# Patient Record
Sex: FEMALE | Race: OTHER | NOT HISPANIC OR LATINO | URBAN - METROPOLITAN AREA
[De-identification: names, ages, dates, MRNs, and addresses within clinical notes are randomized per-mention and may not be internally consistent; named-entity substitution may affect disease eponyms.]

---

## 2018-12-08 ENCOUNTER — INPATIENT (INPATIENT)
Facility: HOSPITAL | Age: 83
LOS: 1 days | Discharge: HOME | DRG: 312 | End: 2018-12-10
Attending: INTERNAL MEDICINE | Admitting: INTERNAL MEDICINE
Payer: MEDICARE

## 2018-12-08 VITALS
TEMPERATURE: 97 F | OXYGEN SATURATION: 100 % | HEART RATE: 65 BPM | SYSTOLIC BLOOD PRESSURE: 108 MMHG | DIASTOLIC BLOOD PRESSURE: 71 MMHG | RESPIRATION RATE: 17 BRPM

## 2018-12-08 DIAGNOSIS — R55 SYNCOPE AND COLLAPSE: ICD-10-CM

## 2018-12-08 DIAGNOSIS — D64.9 ANEMIA, UNSPECIFIED: ICD-10-CM

## 2018-12-08 DIAGNOSIS — Z29.9 ENCOUNTER FOR PROPHYLACTIC MEASURES, UNSPECIFIED: ICD-10-CM

## 2018-12-08 DIAGNOSIS — I10 ESSENTIAL (PRIMARY) HYPERTENSION: ICD-10-CM

## 2018-12-08 LAB
ALBUMIN SERPL ELPH-MCNC: 4.3 G/DL — SIGNIFICANT CHANGE UP (ref 3.3–5)
ALP SERPL-CCNC: 52 U/L — SIGNIFICANT CHANGE UP (ref 40–120)
ALT FLD-CCNC: 12 U/L — SIGNIFICANT CHANGE UP (ref 10–45)
ANION GAP SERPL CALC-SCNC: 15 MMOL/L — SIGNIFICANT CHANGE UP (ref 5–17)
AST SERPL-CCNC: 16 U/L — SIGNIFICANT CHANGE UP (ref 10–40)
BASOPHILS NFR BLD AUTO: 0.6 % — SIGNIFICANT CHANGE UP (ref 0–2)
BILIRUB SERPL-MCNC: <0.2 MG/DL — SIGNIFICANT CHANGE UP (ref 0.2–1.2)
BUN SERPL-MCNC: 17 MG/DL — SIGNIFICANT CHANGE UP (ref 7–23)
CALCIUM SERPL-MCNC: 9.4 MG/DL — SIGNIFICANT CHANGE UP (ref 8.4–10.5)
CHLORIDE SERPL-SCNC: 100 MMOL/L — SIGNIFICANT CHANGE UP (ref 96–108)
CO2 SERPL-SCNC: 24 MMOL/L — SIGNIFICANT CHANGE UP (ref 22–31)
CREAT SERPL-MCNC: 0.87 MG/DL — SIGNIFICANT CHANGE UP (ref 0.5–1.3)
EOSINOPHIL NFR BLD AUTO: 1.2 % — SIGNIFICANT CHANGE UP (ref 0–6)
GLUCOSE SERPL-MCNC: 123 MG/DL — HIGH (ref 70–99)
HCT VFR BLD CALC: 33 % — LOW (ref 34.5–45)
HGB BLD-MCNC: 10.3 G/DL — LOW (ref 11.5–15.5)
LYMPHOCYTES # BLD AUTO: 20.8 % — SIGNIFICANT CHANGE UP (ref 13–44)
MCHC RBC-ENTMCNC: 24.9 PG — LOW (ref 27–34)
MCHC RBC-ENTMCNC: 31.2 G/DL — LOW (ref 32–36)
MCV RBC AUTO: 79.9 FL — LOW (ref 80–100)
MONOCYTES NFR BLD AUTO: 8.9 % — SIGNIFICANT CHANGE UP (ref 2–14)
NEUTROPHILS NFR BLD AUTO: 68.5 % — SIGNIFICANT CHANGE UP (ref 43–77)
NT-PROBNP SERPL-SCNC: 87 PG/ML — SIGNIFICANT CHANGE UP (ref 0–300)
PLATELET # BLD AUTO: 386 K/UL — SIGNIFICANT CHANGE UP (ref 150–400)
POTASSIUM SERPL-MCNC: 3.8 MMOL/L — SIGNIFICANT CHANGE UP (ref 3.5–5.3)
POTASSIUM SERPL-SCNC: 3.8 MMOL/L — SIGNIFICANT CHANGE UP (ref 3.5–5.3)
PROT SERPL-MCNC: 7 G/DL — SIGNIFICANT CHANGE UP (ref 6–8.3)
RBC # BLD: 4.13 M/UL — SIGNIFICANT CHANGE UP (ref 3.8–5.2)
RBC # FLD: 15 % — SIGNIFICANT CHANGE UP (ref 10.3–16.9)
SODIUM SERPL-SCNC: 139 MMOL/L — SIGNIFICANT CHANGE UP (ref 135–145)
TROPONIN T SERPL-MCNC: <0.01 NG/ML — SIGNIFICANT CHANGE UP (ref 0–0.01)
WBC # BLD: 8.2 K/UL — SIGNIFICANT CHANGE UP (ref 3.8–10.5)
WBC # FLD AUTO: 8.2 K/UL — SIGNIFICANT CHANGE UP (ref 3.8–10.5)

## 2018-12-08 PROCEDURE — 93010 ELECTROCARDIOGRAM REPORT: CPT

## 2018-12-08 PROCEDURE — 71045 X-RAY EXAM CHEST 1 VIEW: CPT | Mod: 26

## 2018-12-08 PROCEDURE — 99285 EMERGENCY DEPT VISIT HI MDM: CPT | Mod: 25

## 2018-12-08 PROCEDURE — 99223 1ST HOSP IP/OBS HIGH 75: CPT | Mod: AI,GC

## 2018-12-08 RX ORDER — AMLODIPINE BESYLATE 2.5 MG/1
5 TABLET ORAL DAILY
Qty: 0 | Refills: 0 | Status: DISCONTINUED | OUTPATIENT
Start: 2018-12-09 | End: 2018-12-10

## 2018-12-08 RX ORDER — HEPARIN SODIUM 5000 [USP'U]/ML
5000 INJECTION INTRAVENOUS; SUBCUTANEOUS EVERY 8 HOURS
Qty: 0 | Refills: 0 | Status: DISCONTINUED | OUTPATIENT
Start: 2018-12-08 | End: 2018-12-10

## 2018-12-08 RX ORDER — ASPIRIN/CALCIUM CARB/MAGNESIUM 324 MG
81 TABLET ORAL DAILY
Qty: 0 | Refills: 0 | Status: DISCONTINUED | OUTPATIENT
Start: 2018-12-08 | End: 2018-12-10

## 2018-12-08 NOTE — H&P ADULT - PROBLEM SELECTOR PLAN 3
History of HTN. Takes History of HTN. Takes amlodipine 5 or 10mg PO qd? Also takes indapamide. unclear of dose  -obtain collateral from home pharmacy in AM ([facility] [phone])  -start amlodipine 5mg PO qd  -monitor BP

## 2018-12-08 NOTE — H&P ADULT - NSHPPHYSICALEXAM_GEN_ALL_CORE
PHYSICAL EXAM:  GENERAL: NAD, well-developed, resting comfortably in bed.   HEENT: EOMI, PERRLA, conjunctiva and sclera clear, MMM  NECK: Supple, No JVD  CHEST/LUNG: Clear to auscultation bilaterally; No wheezes/rales/rhonci.   HEART: Regular rate and rhythm; No murmurs, rubs, or gallops  ABDOMEN: Soft, Nontender, Nondistended; Bowel sounds present  EXTREMITIES:, No clubbing, cyanosis, or edema  NEUROLOGY: non-focal, AAOx3  SKIN: No rashes or lesions

## 2018-12-08 NOTE — ED ADULT NURSE NOTE - OBJECTIVE STATEMENT
Patient present to ED with complains of syncope episode today after dinner, as per family member, patient did not hit head, was caught going down slowly, denies chest pain, nausea, vomiting, fever, chills, and abdomen pain.

## 2018-12-08 NOTE — ED ADULT TRIAGE NOTE - CHIEF COMPLAINT QUOTE
biba for syncopal episode.  Patient walking home and passed out for about 15 secs.  Fs 154.  Denies chest pain, dizziness, pain.  Speaking clearly and coherently in full sentences

## 2018-12-08 NOTE — ED PROVIDER NOTE - PHYSICAL EXAMINATION
CONSTITUTIONAL: WD,WN. NAD.    SKIN: Normal color and turgor. No rash.    HEAD: NC/AT.  EYES: Conjunctiva clear. EOMI. PERRL.    ENT: Airway patent, OP without erythema, tonsillar swelling or exudate; uvula midline without swelling. Nasal mucosa clear, no rhinorrhea.   RESPIRATORY:  Breathing non-labored. No retractions or accessory muscle use.  Lungs bilateral crackles at bases.  CARDIOVASCULAR:  RRR, S1S2. No M/R/G.      GI:  Abdomen soft, nontender.    MSK: Neck supple with painless ROM.  No lower extremity edema or calf tenderness.  No joint swelling or ROM limitation.  NEURO: Alert and oriented; CN II-XII grossly intact. Speech clear. 5/5 strength in all extremities.  Normal balance and gait.

## 2018-12-08 NOTE — ED ADULT NURSE NOTE - NSIMPLEMENTINTERV_GEN_ALL_ED
Implemented All Fall Risk Interventions:  [place] to call system. Call bell, personal items and telephone within reach. Instruct patient to call for assistance. Room bathroom lighting operational. Non-slip footwear when patient is off stretcher. Physically safe environment: no spills, clutter or unnecessary equipment. Stretcher in lowest position, wheels locked, appropriate side rails in place. Provide visual cue, wrist band, yellow gown, etc. Monitor gait and stability. Monitor for mental status changes and reorient to person, place, and time. Review medications for side effects contributing to fall risk. Reinforce activity limits and safety measures with patient and family.

## 2018-12-08 NOTE — H&P ADULT - ATTENDING COMMENTS
Assessment: Patient personally seen and examined myself during rounds with the Physician Assistant/House Staff/Nurse Practitioner   ON DATE 12/8/18  Physician Assistant/House Staff/Nurse Practitioner note read, including vitals, physical findings, laboratory data, and radiological reports.   Revisions included below.   Direct personal management at bed side and extensive interpretation of the data.    Plan was outlined and discussed in details with the Physician Assistant/House Staff/Nurse Practitioner.    Decision making of high complexity   Risk high of complications, morbidity, and/or mortality  Assessment and Action taken for acute disease activity to reflect the level of care provided:  -Hemodynamic evaluation and support  -ACS assessment and treatment as applicable  -Heart failure assessment and treatment as applicable  -Cardiac Telemetry reviewed  -Medication reconciliation  -Review laboratory data  -EKG reviewed - no stemi  -Echo ordered  -Interdisciplinary discussion with IC / EP / HF / CTS teams as needed  TIME SPENT in evaluation and management, reassessments, review and interpretation of labs and x-rays, and hemodynamic management, formulating a plan and coordinating care: ___70____ MIN.  Time does not include procedural time.    Faustino Cross MD  Cardiology    Mobile: [phone]  Office: [phone]  [address] [place], [place] [address]

## 2018-12-08 NOTE — H&P ADULT - NSHPLABSRESULTS_GEN_ALL_CORE
LABS:                         10.3   8.2   )-----------( 386      ( 08 Dec 2018 20:22 )             33.0     12-08    139  |  100  |  17  ----------------------------<  123<H>  3.8   |  24  |  0.87    Ca    9.4      08 Dec 2018 20:22    TPro  7.0  /  Alb  4.3  /  TBili  <0.2  /  DBili  x   /  AST  16  /  ALT  12  /  AlkPhos  52  12-08        CARDIAC MARKERS ( 08 Dec 2018 20:22 )  x     / <0.01 ng/mL / x     / x     / x          Serum Pro-Brain Natriuretic Peptide: 87 pg/mL (12-08 @ 20:22)        RADIOLOGY, EKG & ADDITIONAL TESTS: Reviewed.

## 2018-12-08 NOTE — ED PROVIDER NOTE - NS ED ROS FT
CONSTITUTIONAL: No fever, chills, or weakness  NEURO: No headache, no dizziness; No focal weakness/tingling/numbness  EYES: No visual changes  ENT: No rhinorrhea or sore throat  PULM: Recent cough/URI.  No dyspnea  CV: No chest pain or palpitations; no leg swelling; no orthopnea or CRUZ.  GI: No abdominal pain, vomiting, or diarrhea  : No dysuria, hematuria, frequency  MSK: No neck pain or back pain, no joint pain  SKIN: no rash or unusual bruising

## 2018-12-08 NOTE — ED PROVIDER NOTE - MEDICAL DECISION MAKING DETAILS
86 yo fem s/p syncopal episode with prodrome.  EKG no ischemic changes. Check labs, keep on monitor, CXR, admit for cardiac monitoring.

## 2018-12-08 NOTE — H&P ADULT - HISTORY OF PRESENT ILLNESS
ED Course: In the ED vitals were T 97.3, HR 65, //60, RR 17, O2 sat 98 RA. Labs significant for Hb 10.3, HCT 33, trop <0.01, pro BNP 87. In ED patient did not receive any medications or fluids. 87F w PMH of HTN presents after syncopal episode this evening. Patient states that she went to dinner tonight and as she stood up to leave the dinner she felt weak and shaky. She said she couldn't walk with out holding on to son's arm for support. She did not have any vision changes, diaphoresis, or palpitations. She walked a block and then began to slump down and "passed out."  Pt has no memory of the syncopal episode.  Per family she had LOC of 15-30 seconds. She did not hit her head. She did not bite her tongue or have urinary incontinence. There was no trauma. Her family called 911 and she was brought to [facility] ED. Pt states that after she regained consciousness she was immediately alert and oriented. She did not have any postictal symptoms. Pt states that she has felt shaky on her feet for the past few days. She has also felt dehydrated and has been eating and drinking less. She had a slight cold 1 week ago but did not have a fever. She has a non productive cough. On ROS pt denies fever/chills, CP, SOB, palpitations, abdominal pain, n/v, d/c, dysuria.    ED Course: In the ED vitals were T 97.3, HR 65, //60, RR 17, O2 sat 98 RA. Labs significant for Hb 10.3, HCT 33, trop <0.01, pro BNP 87. EKG showed NSRR, 1st deg block, , PACs, no ischemic changes In ED patient did not receive any medications or fluids. 87F w PMH of HTN presents after syncopal episode this evening. Patient states that she went to dinner tonight and as she stood up to leave the dinner she felt weak and shaky. She said she couldn't walk without holding on to son's arm for support. She did not have any vision changes, diaphoresis, or palpitations. She walked a block and then began to slump down and "passed out."  Pt has no memory of the syncopal episode.  Per family she had LOC of 15-30 seconds. She did not hit her head. She did not bite her tongue or have urinary incontinence. There was no trauma. Her family called 911 and she was brought to [facility] ED. Pt states that after she regained consciousness she was immediately alert and oriented. She did not have any postictal symptoms. Pt has never had a syncopal episode before. Pt states that she has felt shaky on her feet for the past few days. She has also felt dehydrated and has been eating and drinking less. She had a slight cold 1 week ago with a non productive cough, but did not have a fever or SOB. On ROS pt denies fever/chills, CP, SOB, palpitations, abdominal pain, n/v, d/c, dysuria.    ED Course: In the ED vitals were T 97.3, HR 65, //60, RR 17, O2 sat 98 RA. Labs significant for Hb 10.3, HCT 33, trop <0.01, pro BNP 87. EKG showed NSRR, 1st deg block, , PACs, no ischemic changes In ED patient did not receive any medications or fluids.

## 2018-12-08 NOTE — H&P ADULT - ASSESSMENT
87F w [facility] of 87F w PMH of HTN presents after syncopal episode tonight with +LOC. Admitted to [facility] for telemetry and syncope workup.

## 2018-12-08 NOTE — PATIENT PROFILE ADULT - NSPROGENPREVTRANSF_GEN_A_NUR
I have personally evaluated and examined the patient. The Attending was available to me as a supervising provider if needed. no

## 2018-12-08 NOTE — ED PROVIDER NOTE - OBJECTIVE STATEMENT
86 yo fem with Hx of HTN BIBA after syncopal episode.  Had dinner with family at restaurant; suddenly weak when she tried to stand up to leave.  She had eaten dinner and had only a small sip of wine.  Walking with family holding onto son's arm she got weaker and had to stop after 1 block, then began to slump down.  She was lowered to sidewalk by her daughter-in-law while son called 911.  LOC of apx 15-30 seconds.  No trauma.  Awake and alert now without complaints.  There were no palpitations, CP, SOB.  No hx of fainting in the past.  Recent mild URI with cough in past week, no sputum or SOB.  Not bedbound or immobilized.  No diarrhea.      Cardiologist ([facility]) Dr Xavier [phone]  PMHx HTN  PSHx thyroid surgery  Meds indapamide (10 mg?) amlodipine 10 mg, aspirin 81 mg  NKA  Social Hx nonsmoker  Family Hx: father  at young age of cerebral hemorrhage, mother  of "old age", brother  of mesothelioma

## 2018-12-08 NOTE — ED PROVIDER NOTE - ATTENDING CONTRIBUTION TO CARE
87 yom pw syncope, states was at dinner, felt weak upon standing up.  no cp, no sob, no other complaints.  no abd pain.     agree w/ PA, nontoxic appearing, no hypoxia, no tachypnea, will check serial enzyme, possibly need admission for medical optimization, reassess

## 2018-12-09 LAB
ANION GAP SERPL CALC-SCNC: 10 MMOL/L — SIGNIFICANT CHANGE UP (ref 5–17)
APTT BLD: 28 SEC — SIGNIFICANT CHANGE UP (ref 27.5–36.3)
BUN SERPL-MCNC: 17 MG/DL — SIGNIFICANT CHANGE UP (ref 7–23)
CALCIUM SERPL-MCNC: 8.8 MG/DL — SIGNIFICANT CHANGE UP (ref 8.4–10.5)
CHLORIDE SERPL-SCNC: 104 MMOL/L — SIGNIFICANT CHANGE UP (ref 96–108)
CO2 SERPL-SCNC: 26 MMOL/L — SIGNIFICANT CHANGE UP (ref 22–31)
CREAT SERPL-MCNC: 0.82 MG/DL — SIGNIFICANT CHANGE UP (ref 0.5–1.3)
FERRITIN SERPL-MCNC: 13 NG/ML — LOW (ref 15–150)
GLUCOSE SERPL-MCNC: 98 MG/DL — SIGNIFICANT CHANGE UP (ref 70–99)
HCT VFR BLD CALC: 31.9 % — LOW (ref 34.5–45)
HGB BLD-MCNC: 10 G/DL — LOW (ref 11.5–15.5)
INR BLD: 0.97 — SIGNIFICANT CHANGE UP (ref 0.88–1.16)
IRON SATN MFR SERPL: 21 UG/DL — LOW (ref 30–160)
IRON SATN MFR SERPL: 6 % — LOW (ref 14–50)
MAGNESIUM SERPL-MCNC: 2.1 MG/DL — SIGNIFICANT CHANGE UP (ref 1.6–2.6)
MCHC RBC-ENTMCNC: 25.1 PG — LOW (ref 27–34)
MCHC RBC-ENTMCNC: 31.3 G/DL — LOW (ref 32–36)
MCV RBC AUTO: 79.9 FL — LOW (ref 80–100)
PHOSPHATE SERPL-MCNC: 3.5 MG/DL — SIGNIFICANT CHANGE UP (ref 2.5–4.5)
PLATELET # BLD AUTO: 383 K/UL — SIGNIFICANT CHANGE UP (ref 150–400)
POTASSIUM SERPL-MCNC: 3.9 MMOL/L — SIGNIFICANT CHANGE UP (ref 3.5–5.3)
POTASSIUM SERPL-SCNC: 3.9 MMOL/L — SIGNIFICANT CHANGE UP (ref 3.5–5.3)
PROTHROM AB SERPL-ACNC: 11 SEC — SIGNIFICANT CHANGE UP (ref 10–12.9)
RAPID RVP RESULT: SIGNIFICANT CHANGE UP
RBC # BLD: 3.99 M/UL — SIGNIFICANT CHANGE UP (ref 3.8–5.2)
RBC # FLD: 15.2 % — SIGNIFICANT CHANGE UP (ref 10.3–16.9)
SODIUM SERPL-SCNC: 140 MMOL/L — SIGNIFICANT CHANGE UP (ref 135–145)
TIBC SERPL-MCNC: 353 UG/DL — SIGNIFICANT CHANGE UP (ref 220–430)
UIBC SERPL-MCNC: 332 UG/DL — SIGNIFICANT CHANGE UP (ref 110–370)
WBC # BLD: 6.4 K/UL — SIGNIFICANT CHANGE UP (ref 3.8–10.5)
WBC # FLD AUTO: 6.4 K/UL — SIGNIFICANT CHANGE UP (ref 3.8–10.5)

## 2018-12-09 PROCEDURE — 99232 SBSQ HOSP IP/OBS MODERATE 35: CPT

## 2018-12-09 RX ORDER — FERROUS SULFATE 325(65) MG
325 TABLET ORAL THREE TIMES A DAY
Qty: 0 | Refills: 0 | Status: DISCONTINUED | OUTPATIENT
Start: 2018-12-09 | End: 2018-12-10

## 2018-12-09 RX ORDER — INDAPAMIDE 1.25 MG
10 TABLET ORAL
Qty: 0 | Refills: 0 | COMMUNITY

## 2018-12-09 RX ORDER — ASPIRIN/CALCIUM CARB/MAGNESIUM 324 MG
1 TABLET ORAL
Qty: 0 | Refills: 0 | COMMUNITY

## 2018-12-09 RX ORDER — AMLODIPINE BESYLATE 2.5 MG/1
1 TABLET ORAL
Qty: 0 | Refills: 0 | COMMUNITY

## 2018-12-09 RX ADMIN — Medication 325 MILLIGRAM(S): at 14:53

## 2018-12-09 RX ADMIN — AMLODIPINE BESYLATE 5 MILLIGRAM(S): 2.5 TABLET ORAL at 08:53

## 2018-12-09 RX ADMIN — HEPARIN SODIUM 5000 UNIT(S): 5000 INJECTION INTRAVENOUS; SUBCUTANEOUS at 14:53

## 2018-12-09 RX ADMIN — HEPARIN SODIUM 5000 UNIT(S): 5000 INJECTION INTRAVENOUS; SUBCUTANEOUS at 06:51

## 2018-12-09 RX ADMIN — HEPARIN SODIUM 5000 UNIT(S): 5000 INJECTION INTRAVENOUS; SUBCUTANEOUS at 21:37

## 2018-12-09 RX ADMIN — Medication 81 MILLIGRAM(S): at 12:09

## 2018-12-09 RX ADMIN — Medication 325 MILLIGRAM(S): at 21:37

## 2018-12-09 NOTE — PROGRESS NOTE ADULT - PROBLEM SELECTOR PLAN 4
DVT ppx: Hep sc  F: No IVF  E: K>4, Mg>2, replete PRN  N: DASH diet- kosher   PT recommends home with outpatient PT

## 2018-12-09 NOTE — PHYSICAL THERAPY INITIAL EVALUATION ADULT - MODALITIES TREATMENT COMMENTS
Home exercise program provided: including heel raises, hip ABD, mini squats & hamstring curls, patient performed return demo of each

## 2018-12-09 NOTE — PHYSICAL THERAPY INITIAL EVALUATION ADULT - GENERAL OBSERVATIONS, REHAB EVAL
Patient received semi fowlers (+) EKG (+) heplock no apparent distress, demonstrating all mobility skills with supervision, patient is cleared for discharge home with follow up with outpatient PT for strength and balance training.

## 2018-12-09 NOTE — PROGRESS NOTE ADULT - PROBLEM SELECTOR PLAN 2
Presenting with Hb of 10.3. MCV 79.9. Could be 2/2 iron deficiency anemia. May be chronic; as per patient is chronic and has been ongoing; colonoscopy done within 10 years with no polyps found per patient; iron studies suggestive of deficiency   - start fe sulfate TID   -obtain collateral about baseline Hb   -maintain active type and screen, transfuse if Hb<7

## 2018-12-09 NOTE — PROGRESS NOTE ADULT - ASSESSMENT
87F w PMH of HTN presents after syncopal episode tonight with +LOC. Admitted to [facility] for telemetry and syncope workup.

## 2018-12-09 NOTE — PROGRESS NOTE ADULT - SUBJECTIVE AND OBJECTIVE BOX
INTERVAL HPI/OVERNIGHT EVENTS:    VITAL SIGNS:  T(F): 98.5 (12-09-18 @ 08:17)  HR: 70 (12-09-18 @ 08:45)  BP: 126/68 (12-09-18 @ 08:45)  RR: 16 (12-09-18 @ 08:45)  SpO2: 98% (12-09-18 @ 08:45)  Wt(kg): --    PHYSICAL EXAM:    Constitutional:  Eyes:  ENMT:  Neck:  Respiratory:  Cardiovascular:  Gastrointestinal:  Extremities:  Vascular:  Neurological:  Musculoskeletal:    MEDICATIONS  (STANDING):  amLODIPine   Tablet 5 milliGRAM(s) Oral daily  aspirin  chewable 81 milliGRAM(s) Oral daily  heparin  Injectable 5000 Unit(s) SubCutaneous every 8 hours    MEDICATIONS  (PRN):      Allergies    No Known Allergies    Intolerances        LABS:                      10.0   6.4   )-----------( 383      ( 09 Dec 2018 06:18 )             31.9     12-09    140  |  104  |  17  ----------------------------<  98  3.9   |  26  |  0.82    Ca    8.8      09 Dec 2018 06:18  Phos  3.5     12-09  Mg     2.1     12-09    TPro  7.0  /  Alb  4.3  /  TBili  <0.2  /  DBili  x   /  AST  16  /  ALT  12  /  AlkPhos  52  12-08    PT/INR - ( 09 Dec 2018 06:18 )   PT: 11.0 sec;   INR: 0.97          PTT - ( 09 Dec 2018 06:18 )  PTT:28.0 sec      RADIOLOGY & ADDITIONAL TESTS:  12/8 CXR is clear with no obvious abnormality INTERVAL HPI/OVERNIGHT EVENTS:    Patient seen and examined at bedside. reports no acute issues. Denies any dizziness, headache, CP, SOB, cough abdominal pain, N/V/D or urinary habits changes,   .  VITAL SIGNS:  T(C): 36.9 (12-09-18 @ 08:17), Max: 37.1 (12-08-18 @ 22:25)  T(F): 98.5 (12-09-18 @ 08:17), Max: 98.8 (12-08-18 @ 22:25)  HR: 70 (12-09-18 @ 08:45) (65 - 92)  BP: 126/68 (12-09-18 @ 08:45) (108/71 - 151/78)  BP(mean): 88 (12-09-18 @ 08:45) (84 - 114)  RR: 16 (12-09-18 @ 08:45) (16 - 17)  SpO2: 98% (12-09-18 @ 08:45) (94% - 100%)  Wt(kg): --    PHYSICAL EXAM:    Constitutional: WDWN resting comfortably in bed; NAD, elderly; wears glasses   Head: NC/AT  Eyes: PERRL, EOMI, clear conjunctiva  ENT: no nasal discharge; uvula midline, no oropharyngeal erythema or exudates; MMM  Neck: supple; no JVD  Respiratory: CTA B/L; no W/R/R, no retractions  Cardiac: +S1/S2; RRR; no M/R/G;   Gastrointestinal: soft, NT/ND; no rebound or guarding; +BS  Genitourinary: normal external genitalia  Extremities: WWP, no clubbing or cyanosis; no peripheral edema  Musculoskeletal: NROM x4;  Vascular: 2+ radial and DP pulses B/L  Dermatologic: skin warm, dry and intact; no rashes, wounds, or scars  Neurologic: AAOx3; CNII-XII intact; no focal deficits  Psychiatric: affect and characteristics of appearance, verbalizations, behaviors are appropriate  MEDICATIONS  (STANDING):  amLODIPine   Tablet 5 milliGRAM(s) Oral daily  aspirin  chewable 81 milliGRAM(s) Oral daily  heparin  Injectable 5000 Unit(s) SubCutaneous every 8 hours    MEDICATIONS  (PRN):      Allergies    No Known Allergies    Intolerances        LABS:                      10.0   6.4   )-----------( 383      ( 09 Dec 2018 06:18 )             31.9     12-09    140  |  104  |  17  ----------------------------<  98  3.9   |  26  |  0.82    Ca    8.8      09 Dec 2018 06:18  Phos  3.5     12-09  Mg     2.1     12-09    TPro  7.0  /  Alb  4.3  /  TBili  <0.2  /  DBili  x   /  AST  16  /  ALT  12  /  AlkPhos  52  12-08    PT/INR - ( 09 Dec 2018 06:18 )   PT: 11.0 sec;   INR: 0.97          PTT - ( 09 Dec 2018 06:18 )  PTT:28.0 sec      RADIOLOGY & ADDITIONAL TESTS:  12/8 CXR is clear with no obvious abnormality INTERVAL HPI/OVERNIGHT EVENTS:    Patient seen and examined at bedside. reports no acute issues. Denies any dizziness, headache, CP, SOB, cough abdominal pain, N/V/D or urinary habits changes,   .  VITAL SIGNS:  T(C): 36.9 (12-09-18 @ 08:17), Max: 37.1 (12-08-18 @ 22:25)  T(F): 98.5 (12-09-18 @ 08:17), Max: 98.8 (12-08-18 @ 22:25)  HR: 70 (12-09-18 @ 08:45) (65 - 92)  BP: 126/68 (12-09-18 @ 08:45) (108/71 - 151/78)  BP(mean): 88 (12-09-18 @ 08:45) (84 - 114)  RR: 16 (12-09-18 @ 08:45) (16 - 17)  SpO2: 98% (12-09-18 @ 08:45) (94% - 100%)  Wt(kg): --    PHYSICAL EXAM:    Constitutional: WDWN resting comfortably in bed; NAD, elderly; wears glasses   Head: NC/AT  Eyes: PERRL, EOMI, clear conjunctiva  ENT: no nasal discharge; uvula midline, no oropharyngeal erythema or exudates; MMM  Neck: supple; no JVD  Respiratory: CTA B/L; no W/R/R, no retractions  Cardiac: +S1/S2; 2/6 systolic murmur appreciated at the RSB  no M/R/G;   Gastrointestinal: soft, NT/ND; no rebound or guarding; +BS  Genitourinary: normal external genitalia  Extremities: WWP, no clubbing or cyanosis; no peripheral edema  Musculoskeletal: NROM x4;  Vascular: 2+ radial and DP pulses B/L  Dermatologic: skin warm, dry and intact; no rashes, wounds, or scars  Neurologic: AAOx3; CNII-XII intact; no focal deficits  Psychiatric: affect and characteristics of appearance, verbalizations, behaviors are appropriate  MEDICATIONS  (STANDING):  amLODIPine   Tablet 5 milliGRAM(s) Oral daily  aspirin  chewable 81 milliGRAM(s) Oral daily  heparin  Injectable 5000 Unit(s) SubCutaneous every 8 hours    MEDICATIONS  (PRN):      Allergies    No Known Allergies    Intolerances        LABS:                      10.0   6.4   )-----------( 383      ( 09 Dec 2018 06:18 )             31.9     12-09    140  |  104  |  17  ----------------------------<  98  3.9   |  26  |  0.82    Ca    8.8      09 Dec 2018 06:18  Phos  3.5     12-09  Mg     2.1     12-09    TPro  7.0  /  Alb  4.3  /  TBili  <0.2  /  DBili  x   /  AST  16  /  ALT  12  /  AlkPhos  52  12-08    PT/INR - ( 09 Dec 2018 06:18 )   PT: 11.0 sec;   INR: 0.97          PTT - ( 09 Dec 2018 06:18 )  PTT:28.0 sec      RADIOLOGY & ADDITIONAL TESTS:  12/8 CXR is clear with no obvious abnormality

## 2018-12-09 NOTE — PROGRESS NOTE ADULT - PROBLEM SELECTOR PLAN 1
Syncopal episode yesterday . LOC of 15-30 seconds Denied palpitations, CP, SOB. No hx of fainting. ED EKG showed NSRR, 1st deg block, , PACs, no ischemic changes. Trop <0.01. Likely 2/2 vasovagal syncope as pt had prodromal symptoms of weakness when went from sitting to standing. Unlikely to be ischemic etiology however patient with slight systolic murmur appreciated at the RSB; would want to r/o severe aortic stenosis; EKG unremarkable, but continue with telemetry for now.  -admit to [facility]  -AM EKG  -f/u echo   -obtain collateral from pt's cardiologist Dr. Xavier [phone]

## 2018-12-09 NOTE — PHYSICAL THERAPY INITIAL EVALUATION ADULT - ADDITIONAL COMMENTS
patient attends a group at the [address] [place] [address], states she does not use her cane but has one at home, no other DME, independent PTA, performs standing balance exercises, no other formal therex routine.  assists with cleaning chores 1x/week, 1 reported fall while exiting a cab 4 months ago where she tripped on her cane.

## 2018-12-09 NOTE — PHYSICAL THERAPY INITIAL EVALUATION ADULT - PLANNED THERAPY INTERVENTIONS, PT EVAL
transfer training/strengthening/gait training/stair neg assessment/postural re-education/balance training/bed mobility training

## 2018-12-09 NOTE — PROGRESS NOTE ADULT - PROBLEM SELECTOR PLAN 3
History of HTN. Takes amlodipine 5 or 10mg PO qd? Also takes indapamide. unclear of dose  -obtain collateral from home pharmacy in AM ([facility] [phone])  -c/w  amlodipine 5mg PO qd  -monitor BP

## 2018-12-09 NOTE — PHYSICAL THERAPY INITIAL EVALUATION ADULT - PERTINENT HX OF CURRENT PROBLEM, REHAB EVAL
88 yo female presents following syncopal episode with 15-20 second LOC, no head trauma, cleared to participate with PT by JOSE JUAN Camarillo.

## 2018-12-10 VITALS — TEMPERATURE: 99 F

## 2018-12-10 LAB
ANION GAP SERPL CALC-SCNC: 7 MMOL/L — SIGNIFICANT CHANGE UP (ref 5–17)
BUN SERPL-MCNC: 11 MG/DL — SIGNIFICANT CHANGE UP (ref 7–23)
CALCIUM SERPL-MCNC: 9.3 MG/DL — SIGNIFICANT CHANGE UP (ref 8.4–10.5)
CHLORIDE SERPL-SCNC: 105 MMOL/L — SIGNIFICANT CHANGE UP (ref 96–108)
CO2 SERPL-SCNC: 28 MMOL/L — SIGNIFICANT CHANGE UP (ref 22–31)
CREAT SERPL-MCNC: 0.78 MG/DL — SIGNIFICANT CHANGE UP (ref 0.5–1.3)
GLUCOSE SERPL-MCNC: 97 MG/DL — SIGNIFICANT CHANGE UP (ref 70–99)
HCT VFR BLD CALC: 33.8 % — LOW (ref 34.5–45)
HGB BLD-MCNC: 10.5 G/DL — LOW (ref 11.5–15.5)
MAGNESIUM SERPL-MCNC: 2.2 MG/DL — SIGNIFICANT CHANGE UP (ref 1.6–2.6)
MCHC RBC-ENTMCNC: 24.8 PG — LOW (ref 27–34)
MCHC RBC-ENTMCNC: 31.1 G/DL — LOW (ref 32–36)
MCV RBC AUTO: 79.7 FL — LOW (ref 80–100)
PHOSPHATE SERPL-MCNC: 3.4 MG/DL — SIGNIFICANT CHANGE UP (ref 2.5–4.5)
PLATELET # BLD AUTO: 393 K/UL — SIGNIFICANT CHANGE UP (ref 150–400)
POTASSIUM SERPL-MCNC: 4.2 MMOL/L — SIGNIFICANT CHANGE UP (ref 3.5–5.3)
POTASSIUM SERPL-SCNC: 4.2 MMOL/L — SIGNIFICANT CHANGE UP (ref 3.5–5.3)
RBC # BLD: 4.24 M/UL — SIGNIFICANT CHANGE UP (ref 3.8–5.2)
RBC # FLD: 15.3 % — SIGNIFICANT CHANGE UP (ref 10.3–16.9)
SODIUM SERPL-SCNC: 140 MMOL/L — SIGNIFICANT CHANGE UP (ref 135–145)
WBC # BLD: 5.5 K/UL — SIGNIFICANT CHANGE UP (ref 3.8–10.5)
WBC # FLD AUTO: 5.5 K/UL — SIGNIFICANT CHANGE UP (ref 3.8–10.5)

## 2018-12-10 PROCEDURE — 87798 DETECT AGENT NOS DNA AMP: CPT

## 2018-12-10 PROCEDURE — 83550 IRON BINDING TEST: CPT

## 2018-12-10 PROCEDURE — 85027 COMPLETE CBC AUTOMATED: CPT

## 2018-12-10 PROCEDURE — 36415 COLL VENOUS BLD VENIPUNCTURE: CPT

## 2018-12-10 PROCEDURE — 84484 ASSAY OF TROPONIN QUANT: CPT

## 2018-12-10 PROCEDURE — 85025 COMPLETE CBC W/AUTO DIFF WBC: CPT

## 2018-12-10 PROCEDURE — 80048 BASIC METABOLIC PNL TOTAL CA: CPT

## 2018-12-10 PROCEDURE — 87633 RESP VIRUS 12-25 TARGETS: CPT

## 2018-12-10 PROCEDURE — 93306 TTE W/DOPPLER COMPLETE: CPT

## 2018-12-10 PROCEDURE — 83880 ASSAY OF NATRIURETIC PEPTIDE: CPT

## 2018-12-10 PROCEDURE — 93306 TTE W/DOPPLER COMPLETE: CPT | Mod: 26

## 2018-12-10 PROCEDURE — 99285 EMERGENCY DEPT VISIT HI MDM: CPT | Mod: 25

## 2018-12-10 PROCEDURE — 85610 PROTHROMBIN TIME: CPT

## 2018-12-10 PROCEDURE — 93005 ELECTROCARDIOGRAM TRACING: CPT

## 2018-12-10 PROCEDURE — 83540 ASSAY OF IRON: CPT

## 2018-12-10 PROCEDURE — 97162 PT EVAL MOD COMPLEX 30 MIN: CPT

## 2018-12-10 PROCEDURE — 82728 ASSAY OF FERRITIN: CPT

## 2018-12-10 PROCEDURE — 80053 COMPREHEN METABOLIC PANEL: CPT

## 2018-12-10 PROCEDURE — 87486 CHLMYD PNEUM DNA AMP PROBE: CPT

## 2018-12-10 PROCEDURE — 83735 ASSAY OF MAGNESIUM: CPT

## 2018-12-10 PROCEDURE — 84100 ASSAY OF PHOSPHORUS: CPT

## 2018-12-10 PROCEDURE — 85730 THROMBOPLASTIN TIME PARTIAL: CPT

## 2018-12-10 PROCEDURE — 71045 X-RAY EXAM CHEST 1 VIEW: CPT

## 2018-12-10 PROCEDURE — 99238 HOSP IP/OBS DSCHRG MGMT 30/<: CPT

## 2018-12-10 PROCEDURE — 87581 M.PNEUMON DNA AMP PROBE: CPT

## 2018-12-10 RX ORDER — RAMIPRIL 5 MG
0 CAPSULE ORAL
Qty: 0 | Refills: 0 | COMMUNITY

## 2018-12-10 RX ORDER — AMLODIPINE BESYLATE 2.5 MG/1
5 TABLET ORAL ONCE
Qty: 0 | Refills: 0 | Status: COMPLETED | OUTPATIENT
Start: 2018-12-10 | End: 2018-12-10

## 2018-12-10 RX ORDER — AMLODIPINE BESYLATE 2.5 MG/1
10 TABLET ORAL DAILY
Qty: 0 | Refills: 0 | Status: DISCONTINUED | OUTPATIENT
Start: 2018-12-11 | End: 2018-12-10

## 2018-12-10 RX ADMIN — Medication 81 MILLIGRAM(S): at 10:08

## 2018-12-10 RX ADMIN — AMLODIPINE BESYLATE 5 MILLIGRAM(S): 2.5 TABLET ORAL at 10:08

## 2018-12-10 RX ADMIN — Medication 325 MILLIGRAM(S): at 05:20

## 2018-12-10 RX ADMIN — HEPARIN SODIUM 5000 UNIT(S): 5000 INJECTION INTRAVENOUS; SUBCUTANEOUS at 05:20

## 2018-12-10 NOTE — DISCHARGE NOTE ADULT - MEDICATION SUMMARY - MEDICATIONS TO STOP TAKING
I will STOP taking the medications listed below when I get home from the hospital:    indapamide  -- 10 milligram(s) by mouth once a day    ramipril 10 mg oral tablet

## 2018-12-10 NOTE — DISCHARGE NOTE ADULT - HOSPITAL COURSE
87F w PMH of HTN who presented after vasovagal syncope (with prodrome of lightheadedness and weakness) with LOC but without tongue biting, urinary or bowel incontinence. Pt did not hit her head and was immediately alert and oriented after LOC; no post ictal period.  Pt has no memory of the syncopal episode. Per family she had LOC of 15-30 seconds. Denied vision changes, diaphoresis, or palpitations. There was no trauma. Her family called 911 and she was brought to [facility] ED. Pt has no hx prior syncopal episodes.  Pt states that she has felt shaky on her feet for the past few days an has felt dehydrated with decreased PO intake. ECHO 12/10/18 with (EF)  is 65-70% with mild concentric left ventricular hypertrophy. Normal left ventricular wall motion. Normal LV diastolic function. No aortic root dilatation, no pericardial effusion. DC to home with home med of amlodipine, PT referral and follow up with her cardiologist Dr. Kenneth Bowen within 1 week of DC.

## 2018-12-10 NOTE — DISCHARGE NOTE ADULT - PROVIDER TOKENS
FREE:[LAST:[Mauricei],FIRST:[[place]ic[place]],PHONE:[phone]],FAX:[(   )    -],ADDRESS:[[facility] [place] [address]]]

## 2018-12-10 NOTE — DISCHARGE NOTE ADULT - PLAN OF CARE
resolved You came to the hospital because you fainted and lost consciousness while walking with your family after eating. You described feeling lightheaded and weak before fainting but did not bite your tongue of have any urinary or bowel incontinence when this happened. After you regained consciousness you were alert and awake. The type of fainting episode you had is likely vasovagal syncope due to the fact that you had symptoms of lightheadedness and weakness and had been eating a drinking less in the days before it occurred. We got an echocardiogram (ECHO) which was largely normal. Your ejection fraction (EF)  is 65-70% with mild concentric left ventricular hypertrophy. The left ventricular wall motion is normal. Normal LV diastolic function. No aortic root dilatation. There is no pericardial effusion. It is important that you follow up with your cardiologist Dr. Bowen when you are discharged. Please make an appointment to see Dr. Kenneth Bowen within a week of your discharge from the hospital. [phone] You have a past medical history of high blood pressure. It is important that you continue taking your home blood pressure medications as prescribed. We have continued your home blood pressure medication of amlodpine 10 mg. Please follow up with Dr. Kenneth Bowen, your cardiologist before restarting your home medication of ramipril. Anemia is a low number of red blood cells or a low amount of hemoglobin in your red blood cells. Hemoglobin is a protein that helps carry oxygen throughout your body. Red blood cells use iron to create hemoglobin. Anemia may develop if your body does not have enough iron. It may also develop if your body does not make enough red blood cells or they die faster than your body can make them. The labs were drawn in the hospital, showed that your hemoglobin is 10.5. Since we do not know your baseline hemoglobin level, it is important that you follow up with your primary care physician when you are discharged to properly work this up.

## 2018-12-10 NOTE — PROGRESS NOTE ADULT - SUBJECTIVE AND OBJECTIVE BOX
87F w PMH of HTN presents after syncopal episode this evening. Patient states that she went to dinner tonight and as she stood up to leave the dinner she felt weak and shaky. She said she couldn't walk without holding on to son's arm for support. She did not have any vision changes, diaphoresis, or palpitations. She walked a block and then began to slump down and "passed out."  Pt has no memory of the syncopal episode.  Per family she had LOC of 15-30 seconds. She did not hit her head. She did not bite her tongue or have urinary incontinence. There was no trauma. Her family called 911 and she was brought to [facility] ED. Pt states that after she regained consciousness she was immediately alert and oriented. She did not have any postictal symptoms. Pt has never had a syncopal episode before. Pt states that she has felt shaky on her feet for the past few days. She has also felt dehydrated and has been eating and drinking less. She had a slight cold 1 week ago with a non productive cough, but did not have a fever or SOB. On ROS pt denies fever/chills, CP, SOB, palpitations, abdominal pain, n/v, d/c, dysuria.    ED Course: In the ED vitals were T 97.3, HR 65, //60, RR 17, O2 sat 98 RA. Labs significant for Hb 10.3, HCT 33, trop <0.01, pro BNP 87. EKG showed NSRR, 1st deg block, , PACs, no ischemic changes In ED patient did not receive any medications or fluids.       Patient History:   Past Medical History:  Cataract    HTN (hypertension).    Past Surgical History:  No significant past surgical history.    	  Pt.S&E@BS [facility]al[facility]        MEDICATIONS:              aspirin  chewable 81 milliGRAM(s) Oral daily  ferrous    sulfate 325 milliGRAM(s) Oral three times a day  heparin  Injectable 5000 Unit(s) SubCutaneous every 8 hours      Complaint:     Otherwise 12 point review of systems is normal.    PHYSICAL EXAM:    ICU Vital Signs Last 24 Hrs  T(C): 37.3 (10 Dec 2018 10:12), Max: 37.3 (10 Dec 2018 10:12)  T(F): 99.1 (10 Dec 2018 10:12), Max: 99.1 (10 Dec 2018 10:12)  HR: 72 (10 Dec 2018 08:42) (64 - 78)  BP: 130/62 (10 Dec 2018 08:42) (112/57 - 142/59)  BP(mean): 85 (10 Dec 2018 08:42) (81 - 97)  ABP: --  ABP(mean): --  RR: 16 (10 Dec 2018 08:42) (16 - 16)  SpO2: 95% (10 Dec 2018 08:42) (93% - 95%)        ECG:    < from: 12 Lead ECG (12.08.18 @ 20:12) >    Diagnosis Line Sinus rhythm with premature atrial complexes  Possible Right ventricular hypertrophy    < end of copied text >    CHEST X RAY  < from: Xray Chest 1 View- PORTABLE-Urgent (12.08.18 @ 21:05) >  Impression: Retrocardiac density which may reflect hiatal hernia.    < end of copied text >    CT    MRI    MRA    CT ANGIO    CAROTID DUPLEX    DUPLEX     Echocardiogram  < from: Echocardiogram (12.10.18 @ 09:54) >  The left atrial size is normal. Right atrial size is normal.There is mild   aortic valve thickening. The aortic valve is trileaflet. No aortic   regurgitation noted. No hemodynamically significant valvular aortic   stenosis.   There is trivial mitral valve thickening. There is trace mitral   regurgitation.    Structurally normal tricuspid valve. There is trace to mild tricuspid   regurgitation.  The pulmonary artery systolic pressure is estimated to   be 30   mmHg.Thepulmonic valve is not well visualized. There is trace pulmonic   regurgitation.  The right ventricle is normal in size and function.There   is   mild concentric left ventricular hypertrophy. The left ventricular wall   motion   is normal. The left ventricular ejection fraction is estimated to be   65-70%.   Normal LV diastolic function.   No aortic root dilatation.There is no   pericardial effusion.  Procedure Details  A complete two-dimensional transthoracic echocardiogram was performed (2D,  M-mode, spectral and color flow doppler).  Study Quality: Good.  Left Ventricle  There is mild concentric left ventricular hypertrophy.  The left ventricular wall motion is normal.  The left ventricular ejection fraction is estimated to be 65-70%  Left Atrium  The left atrial size is normal.  Right Atrium  Right atrial size is normal.  Right Ventricle  The right ventricle is normal in size and function.  Aortic Valve  There is mild aortic valve thickening.  The aortic valve is trileaflet.  No aorticregurgitation noted.  No hemodynamically significant valvular aortic stenosis.  Mitral Valve  There is trivial mitral valve thickening.  There is trace mitral regurgitation.  Tricuspid Valve  Structurally normal tricuspid valve.  There is trace to mild tricuspid regurgitation.  The pulmonary artery systolic pressure is estimated to be 30 mmHg.  Pulmonic Valve  The pulmonic valve is not well visualized.  There is trace pulmonic regurgitation.  Arteries and Venous System  No aortic root dilatation.  The inferior vena cava is normal in size (<2.1 cm) with normal inspiratory  collapse (>50%) consistent with normal right atrial pressure.  Pericardium / Pleura  There is no pericardial effusion.    < end of copied text >    Catheterization:    Stress Test:     LABS:	 	  CARDIAC MARKERS:  Troponin T, Serum: <0.01 ng/mL [0.00 - 0.01] (12-08 @ 20:22)                              10.5   5.5   )-----------( 393      ( 10 Dec 2018 05:57 )             33.8     12-10    140  |  105  |  11  ----------------------------<  97  4.2   |  28  |  0.78    Ca    9.3      10 Dec 2018 05:56  Phos  3.4     12-10  Mg     2.2     12-10    TPro  7.0  /  Alb  4.3  /  TBili  <0.2  /  DBili  x   /  AST  16  /  ALT  12  /  AlkPhos  52  12-08    proBNP: Serum Pro-Brain Natriuretic Peptide: 87 pg/mL (12-08 @ 20:22)    ASSESSMENT/PLAN: 	    87F w PMH of HTN who presented after vasovagal syncope (with prodrome of lightheadedness and weakness) with LOC but without tongue biting, urinary or bowel incontinence. Pt did not hit her head and was immediately alert and oriented after LOC; no post ictal period.  Pt has no memory of the syncopal episode. Per family she had LOC of 15-30 seconds. Denied vision changes, diaphoresis, or palpitations. There was no trauma. Her family called 911 and she was brought to [facility] ED. Pt has no hx prior syncopal episodes.  Pt states that she has felt shaky on her feet for the past few days an has felt dehydrated with decreased PO intake. ECHO 12/10/18 with (EF)  is 65-70% with mild concentric left ventricular hypertrophy. Normal left ventricular wall motion. Normal LV diastolic function. No aortic root dilatation, no pericardial effusion. DC to home with home med of amlodipine, PT referral and follow up with her cardiologist Dr. Kenneth Bowen within 1 week of DC.

## 2018-12-10 NOTE — DISCHARGE NOTE ADULT - PATIENT PORTAL LINK FT
You can access the [facility], offered by [facility], by registering with the following website: [email or web address]

## 2018-12-10 NOTE — DISCHARGE NOTE ADULT - CARE PLAN
Principal Discharge DX:	Vasovagal syncope  Goal:	resolved  Assessment and plan of treatment:	You came to the hospital because you fainted and lost consciousness while walking with your family after eating. You described feeling lightheaded and weak before fainting but did not bite your tongue of have any urinary or bowel incontinence when this happened. After you regained consciousness you were alert and awake. The type of fainting episode you had is likely vasovagal syncope due to the fact that you had symptoms of lightheadedness and weakness and had been eating a drinking less in the days before it occurred. We got an echocardiogram (ECHO) which was largely normal. Your ejection fraction (EF)  is 65-70% with mild concentric left ventricular hypertrophy. The left ventricular wall motion is normal. Normal LV diastolic function. No aortic root dilatation. There is no pericardial effusion. It is important that you follow up with your cardiologist Dr. Bowen when you are discharged. Please make an appointment to see Dr. Kenneth Bowen within a week of your discharge from the hospital. [phone]  Secondary Diagnosis:	Hypertension, unspecified type  Assessment and plan of treatment:	You have a past medical history of high blood pressure. It is important that you continue taking your home blood pressure medications as prescribed. We have continued your home blood pressure medication of amlodpine 10 mg. Please follow up with Dr. Kenneth Bowen, your cardiologist before restarting your home medication of ramipril.  Secondary Diagnosis:	Anemia  Assessment and plan of treatment:	Anemia is a low number of red blood cells or a low amount of hemoglobin in your red blood cells. Hemoglobin is a protein that helps carry oxygen throughout your body. Red blood cells use iron to create hemoglobin. Anemia may develop if your body does not have enough iron. It may also develop if your body does not make enough red blood cells or they die faster than your body can make them. The labs were drawn in the hospital, showed that your hemoglobin is 10.5. Since we do not know your baseline hemoglobin level, it is important that you follow up with your primary care physician when you are discharged to properly work this up.

## 2018-12-10 NOTE — DISCHARGE NOTE ADULT - ADDITIONAL INSTRUCTIONS
Please make an appointment to follow up with your cardiologist Dr. Kenneth Bowen within one week of your discharge from the hospital. [phone]

## 2018-12-10 NOTE — DISCHARGE NOTE ADULT - MEDICATION SUMMARY - MEDICATIONS TO TAKE
I will START or STAY ON the medications listed below when I get home from the hospital:    aspirin 81 mg oral tablet  -- 1 tab(s) by mouth once a day  -- Indication: For Prophylactic measure    amLODIPine 10 mg oral tablet  -- 1 tab(s) by mouth once a day  -- Indication: For HTN (hypertension)

## 2018-12-12 DIAGNOSIS — R55 SYNCOPE AND COLLAPSE: ICD-10-CM

## 2018-12-12 DIAGNOSIS — D50.9 IRON DEFICIENCY ANEMIA, UNSPECIFIED: ICD-10-CM

## 2018-12-12 DIAGNOSIS — Z79.82 LONG TERM (CURRENT) USE OF ASPIRIN: ICD-10-CM

## 2018-12-12 DIAGNOSIS — I10 ESSENTIAL (PRIMARY) HYPERTENSION: ICD-10-CM

## 2020-06-09 NOTE — H&P ADULT - PROBLEM SELECTOR PLAN 1
Routed to GERMAN Jang for review and recommendation.     EKG showed Syncopal episode this evening. LOC of 15-30 seconds Denied palpitations, CP, SOB. No hx of fainting. ED EKG showed NSRR, 1st deg block, , PACs, no ischemic changes. Trop <0.01. Likely 2/2 vasovagal syncope as pt had prodromal symptoms of weakness when went from sitting to standing. Unlikely to be cardiac etiology as EKG unremarkable, but continue with telemetry for now.  -admit to [facility]  -AM EKG  -orthostatics  -AM EKG  -RVP Syncopal episode this evening. LOC of 15-30 seconds Denied palpitations, CP, SOB. No hx of fainting. ED EKG showed NSRR, 1st deg block, , PACs, no ischemic changes. Trop <0.01. Likely 2/2 vasovagal syncope as pt had prodromal symptoms of weakness when went from sitting to standing. Unlikely to be cardiac etiology as EKG unremarkable, but continue with telemetry for now.  -admit to 5[facility]  -AM EKG  -orthostatics  -RVP Syncopal episode this evening. LOC of 15-30 seconds Denied palpitations, CP, SOB. No hx of fainting. ED EKG showed NSRR, 1st deg block, , PACs, no ischemic changes. Trop <0.01. Likely 2/2 vasovagal syncope as pt had prodromal symptoms of weakness when went from sitting to standing. Unlikely to be cardiac etiology as EKG unremarkable, but continue with telemetry for now.  -admit to 5[facility]man  -AM EKG  -orthostatics  -RVP as pt had a cold a week ago Syncopal episode this evening. LOC of 15-30 seconds Denied palpitations, CP, SOB. No hx of fainting. ED EKG showed NSRR, 1st deg block, , PACs, no ischemic changes. Trop <0.01. Likely 2/2 vasovagal syncope as pt had prodromal symptoms of weakness when went from sitting to standing. Unlikely to be cardiac etiology as EKG unremarkable, but continue with telemetry for now.  -admit to 5[facility]  -AM EKG  -no murmurs heard on exam. no need for echo at this time.  -obtain collateral from pt's cardiologist Dr. Xavier [phone]  -orthostatics  -RVP as pt had a cold a week ago

## 2021-07-21 NOTE — PHYSICAL THERAPY INITIAL EVALUATION ADULT - DISCHARGE PLANNER MADE AWARE
Called pt and regarding the Cardiac risk factors, pt states she will wait for her appt in 2 mths. I asked the pt if she had any providers/GI's in mind and she does not know of any. She stated she just wanted to get the colonoscopy done. The cologaurd is covered by her insurance but after talking to [employer] and friend, pt stated that the colonoscopy seemed like the better idea. And the colonoscopy is covered 100% by her insurance. yes